# Patient Record
Sex: FEMALE | Race: WHITE | NOT HISPANIC OR LATINO | URBAN - METROPOLITAN AREA
[De-identification: names, ages, dates, MRNs, and addresses within clinical notes are randomized per-mention and may not be internally consistent; named-entity substitution may affect disease eponyms.]

---

## 2017-03-31 ENCOUNTER — INPATIENT HOSPITAL (INPATIENT)
Dept: URBAN - METROPOLITAN AREA HOSPITAL 32 | Facility: HOSPITAL | Age: 37
End: 2017-03-31

## 2017-03-31 DIAGNOSIS — D62 ACUTE POSTHEMORRHAGIC ANEMIA: ICD-10-CM

## 2017-03-31 DIAGNOSIS — K25.0 ACUTE GASTRIC ULCER WITH HEMORRHAGE: ICD-10-CM

## 2017-03-31 DIAGNOSIS — F10.188 ALCOHOL ABUSE WITH OTHER ALCOHOL-INDUCED DISORDER: ICD-10-CM

## 2017-03-31 DIAGNOSIS — R00.0 TACHYCARDIA, UNSPECIFIED: ICD-10-CM

## 2017-03-31 DIAGNOSIS — K92.0 HEMATEMESIS: ICD-10-CM

## 2017-03-31 PROCEDURE — 43255 EGD CONTROL BLEEDING ANY: CPT

## 2017-03-31 PROCEDURE — 99223 1ST HOSP IP/OBS HIGH 75: CPT | Mod: 25

## 2017-04-01 PROCEDURE — 99233 SBSQ HOSP IP/OBS HIGH 50: CPT

## 2017-04-02 PROCEDURE — 99233 SBSQ HOSP IP/OBS HIGH 50: CPT

## 2017-04-03 ENCOUNTER — INPATIENT HOSPITAL (INPATIENT)
Dept: URBAN - METROPOLITAN AREA HOSPITAL 32 | Facility: HOSPITAL | Age: 37
End: 2017-04-03

## 2017-04-03 DIAGNOSIS — E23.2 DIABETES INSIPIDUS: ICD-10-CM

## 2017-04-03 DIAGNOSIS — E88.09 OTHER DISORDERS OF PLASMA-PROTEIN METABOLISM, NOT ELSEWHERE: ICD-10-CM

## 2017-04-03 DIAGNOSIS — K92.0 HEMATEMESIS: ICD-10-CM

## 2017-04-03 DIAGNOSIS — A41.9 SEPSIS, UNSPECIFIED ORGANISM: ICD-10-CM

## 2017-04-03 DIAGNOSIS — D72.829 ELEVATED WHITE BLOOD CELL COUNT, UNSPECIFIED: ICD-10-CM

## 2017-04-03 DIAGNOSIS — K92.2 GASTROINTESTINAL HEMORRHAGE, UNSPECIFIED: ICD-10-CM

## 2017-04-03 PROCEDURE — 43235 EGD DIAGNOSTIC BRUSH WASH: CPT

## 2017-04-04 ENCOUNTER — INPATIENT HOSPITAL (INPATIENT)
Dept: URBAN - METROPOLITAN AREA HOSPITAL 32 | Facility: HOSPITAL | Age: 37
End: 2017-04-04

## 2017-04-04 DIAGNOSIS — K92.0 HEMATEMESIS: ICD-10-CM

## 2017-04-04 DIAGNOSIS — E23.2 DIABETES INSIPIDUS: ICD-10-CM

## 2017-04-04 DIAGNOSIS — D72.829 ELEVATED WHITE BLOOD CELL COUNT, UNSPECIFIED: ICD-10-CM

## 2017-04-04 DIAGNOSIS — K92.2 GASTROINTESTINAL HEMORRHAGE, UNSPECIFIED: ICD-10-CM

## 2017-04-04 DIAGNOSIS — A41.9 SEPSIS, UNSPECIFIED ORGANISM: ICD-10-CM

## 2017-04-04 DIAGNOSIS — E88.09 OTHER DISORDERS OF PLASMA-PROTEIN METABOLISM, NOT ELSEWHERE: ICD-10-CM

## 2017-04-04 PROCEDURE — 99232 SBSQ HOSP IP/OBS MODERATE 35: CPT

## 2017-04-13 ENCOUNTER — EMERGENCY ROOM – HOSPITAL (INPATIENT)
Dept: URBAN - METROPOLITAN AREA HOSPITAL 29 | Facility: HOSPITAL | Age: 37
End: 2017-04-13
Payer: MEDICARE

## 2017-04-13 DIAGNOSIS — Z87.11 PERSONAL HISTORY OF PEPTIC ULCER DISEASE: ICD-10-CM

## 2017-04-13 DIAGNOSIS — D62 ACUTE POSTHEMORRHAGIC ANEMIA: ICD-10-CM

## 2017-04-13 DIAGNOSIS — K92.2 GASTROINTESTINAL HEMORRHAGE, UNSPECIFIED: ICD-10-CM

## 2017-04-13 PROCEDURE — 99243 OFF/OP CNSLTJ NEW/EST LOW 30: CPT

## 2017-04-13 PROCEDURE — 99203 OFFICE O/P NEW LOW 30 MIN: CPT

## 2017-05-04 ENCOUNTER — OFFICE (INPATIENT)
Dept: URBAN - METROPOLITAN AREA CLINIC 101 | Facility: CLINIC | Age: 37
End: 2017-05-04

## 2017-05-04 VITALS
WEIGHT: 145 LBS | HEART RATE: 102 BPM | TEMPERATURE: 98.1 F | SYSTOLIC BLOOD PRESSURE: 145 MMHG | HEIGHT: 60 IN | DIASTOLIC BLOOD PRESSURE: 108 MMHG

## 2017-05-04 DIAGNOSIS — K25.9 GASTRIC ULCER, UNSPECIFIED AS ACUTE OR CHRONIC, WITHOUT HEMO: ICD-10-CM

## 2017-05-04 PROCEDURE — 99204 OFFICE O/P NEW MOD 45 MIN: CPT

## 2017-05-04 NOTE — SERVICEHPINOTES
COMPA WERNER   is a   37   year old    female who is being seen in consultation at the request of   CHICHI GERMAN   for follow up to  for GI bleed. Her mother is present for the office visit. She was hospitalized 3/31/17-4/8/2017 for sepsis, diabetes inspidus, GI bleed, pyelonephritis, and ARF. She had an emergent EGD in ICU for hypotensive shock 3/31/17 found to have 8 gastric ulcers, 2 required clips. She has been taking pantoprazole 40mg BID.  She was given 8 units of PRBC's. She was discharged home and returned to  for ongoing symptoms due to GI bleed. She had a repeat EGD 4/3/2017 no signs of active bleeding. Her last CBC was 4/13/17 wbc-11.8, hgb-10.5, hct-32, mcv-93.1, plt-681. She is taking ferrous sulfate 325mg BID. She continues to smoke. She denies chest pain, shortness of breath, heart palpitations and fatigue.

## 2017-07-19 ENCOUNTER — OFFICE (INPATIENT)
Dept: URBAN - METROPOLITAN AREA CLINIC 78 | Facility: CLINIC | Age: 37
End: 2017-07-19

## 2017-07-19 PROCEDURE — 00006: CPT
